# Patient Record
Sex: MALE | Race: WHITE | Employment: FULL TIME
[De-identification: names, ages, dates, MRNs, and addresses within clinical notes are randomized per-mention and may not be internally consistent; named-entity substitution may affect disease eponyms.]

---

## 2017-06-21 ENCOUNTER — HOSPITAL ENCOUNTER (OUTPATIENT)
Dept: PHYSICAL THERAPY | Facility: CLINIC | Age: 42
Setting detail: THERAPIES SERIES
Discharge: HOME | End: 2017-06-21
Payer: COMMERCIAL

## 2017-06-21 PROCEDURE — 97750 PHYSICAL PERFORMANCE TEST: CPT

## 2017-06-21 PROCEDURE — 97110 THERAPEUTIC EXERCISES: CPT

## 2017-06-21 PROCEDURE — 97161 PT EVAL LOW COMPLEX 20 MIN: CPT

## 2017-07-03 ENCOUNTER — HOSPITAL ENCOUNTER (OUTPATIENT)
Dept: PHYSICAL THERAPY | Facility: CLINIC | Age: 42
Setting detail: THERAPIES SERIES
Discharge: HOME | End: 2017-07-03
Payer: COMMERCIAL

## 2017-07-03 PROCEDURE — 97140 MANUAL THERAPY 1/> REGIONS: CPT

## 2017-07-03 PROCEDURE — 97016 VASOPNEUMATIC DEVICE THERAPY: CPT

## 2017-07-03 PROCEDURE — 97110 THERAPEUTIC EXERCISES: CPT

## 2017-07-10 ENCOUNTER — HOSPITAL ENCOUNTER (OUTPATIENT)
Dept: PHYSICAL THERAPY | Facility: CLINIC | Age: 42
Setting detail: THERAPIES SERIES
Discharge: HOME | End: 2017-07-10
Payer: COMMERCIAL

## 2019-02-26 ENCOUNTER — HOSPITAL ENCOUNTER (OUTPATIENT)
Dept: PHYSICAL THERAPY | Facility: CLINIC | Age: 44
Setting detail: THERAPIES SERIES
Discharge: HOME | End: 2019-02-26
Payer: COMMERCIAL

## 2019-02-26 PROCEDURE — 97110 THERAPEUTIC EXERCISES: CPT

## 2019-02-26 PROCEDURE — 97161 PT EVAL LOW COMPLEX 20 MIN: CPT

## 2019-12-13 ENCOUNTER — HOSPITAL ENCOUNTER (OUTPATIENT)
Dept: PHYSICAL THERAPY | Facility: CLINIC | Age: 44
Setting detail: THERAPIES SERIES
Discharge: HOME | End: 2019-12-13
Payer: COMMERCIAL

## 2019-12-13 PROCEDURE — 97140 MANUAL THERAPY 1/> REGIONS: CPT

## 2019-12-13 PROCEDURE — 97161 PT EVAL LOW COMPLEX 20 MIN: CPT

## 2020-09-09 ENCOUNTER — HOSPITAL ENCOUNTER (OUTPATIENT)
Dept: PHYSICAL THERAPY | Facility: CLINIC | Age: 45
Setting detail: THERAPIES SERIES
Discharge: HOME | End: 2020-09-09
Payer: COMMERCIAL

## 2020-09-09 PROCEDURE — 97140 MANUAL THERAPY 1/> REGIONS: CPT

## 2020-09-09 PROCEDURE — 97110 THERAPEUTIC EXERCISES: CPT

## 2020-09-09 PROCEDURE — 97161 PT EVAL LOW COMPLEX 20 MIN: CPT

## 2020-09-09 NOTE — CONSULTS
[x] [facility]    [address] [facility],     Phone: [phone]    Fax:  [phone]     Physical Therapy Running Evaluation    Date:  2020  Patient: Canelo Wallace  : 1975  MRN: [id number]  Physician: Dr. Elida Song: [address] (40visits, $35, no coinsur, ded waived, OOP met)  Medical Diagnosis: Chronic LBP Rehab Codes: M54.5  Onset date: 9/3/20   Next 's appt.: none    Subjective:   CC: Ricardo LBP   HPI: pain feels like it is inside. Pain has increased with return from a forward bent position and with sitting. PMHx: [x] Unremarkable [] Diabetes [] HTN  [] Pacemaker   [] MI/Heart Problems [] Cancer [] Arthritis  [] Other:              [] Refer to full medical chart  In EPIC     Tests: [] X-Ray: [] MRI:  [] none:     Medications: [] Refer to full medical record [] None [] Other:  Allergies:      [] Refer to full medical record [] None [] Other:    Working:  [x] Normal Duty  [] Light Duty  [] Off D/T Condition  [] Retired    [] Not Employed    []  Disability  [] Other:           Return to work:     Job/ADL Description: [address] [place] [address] Street: a great deal of travel  Next goal race:  [employer]  trail    Pain:  [x] Yes  [] No   Location:   Pain Rating: (0-10 scale) Worst Now  1. LB      2-7/10   7/10 for a short duration      Pain altered Tx:  [] Yes  [x] No  Action:  Symptoms:  [] Improving [] Worsening [x] Same  Better:  [] Meds    [] Ice pack    [] Sit    [] Not running  []Stand    [] Walk    [] Stretching   [x] Other: foam rolling 7 days/wk  Worse: [] Run    [] Easy    [] Speed work    []Stand    [] Walk    [] Stairs    [x] Sit    [x] Other: assuming a more erect position,   Sleep: [] OK    [] Disturbed    Objective:    ROM  ° A/P STRENGTH TESTS (+/-) Left Right Not Tested    Left Right Left Right Ant.  Drawer   []   Hip Flex 100 100   Post. Drawer   []   Ext 10 10   Lachmans   []   ER stiff stiff   Valgus Stress   []   IR stiff stiff   Varus Stress   []   ABD 20 20   Sharifas   []   ADD     Pat-Fem Grind   []   Knee Flex     FADIRs   []   Ext     Hip Scouring   []   Ankle DF     ANDREWs - - []   PF     Piriformis   []   INV     Haydees   []   EVER     Derek     []   GTE     Rodrigo's   []       OBSERVATION No Deficit Deficit Not Tested Comments   Posture       Forward Head [] [] []    Rounded Shoulders [] [] []    Kyphosis [] [] []    Lordosis [] [x] [] increased   Scoliosis [] [] []    Iliac Crest [] [] []    PSIS [] [] []    ASIS [] [] []    Genu Valgus [] [] []    Genu Varus [] [] []    Genu Recurvatum [] [] []    Pronation [] [] []    Supination [] [] []    Leg Length Discrp [x] [] []    Slumped Sitting [] [] []    Palpation [] [x] [] Sindy prox and distal hip flexor, piriformis   Sensation [] [] []    Edema [] [] []    Neurological [] [] []    Patellar Mobility [] [] []    Patellar Orientation [] [] []    Gait [] [] [] Analysis:Deferred         Comments:  Assessment: pt presents with increased sindy LBP which is due to Sindy hip flexor and piriformis spasm, that limit his hip mobility  Problems:    [x] ? Pain:     [x] ? ROM:    [x] ? Strength:    [x] ? Function: Not able to run as s/he wishes       STG: (to be met in 5 treatments)  1. ? Pain:<3/10 at the worst on an occasional basis  2. ? ROM: hip ext to 20 deg   3. ? Strength: at least 4/5 with all hip movments sindy  4. ? Function:   5. Independent with Home Exercise Programs    LTG: (to be met in 10 treatments)  1. No pain in the low back  2. MMT is 5/5 with all hip movements  3. Able to run  [employer] 1/2 marathon  4.                   Patient goals: Path to eliminate back pain    Rehab Potential:  [x] Good  [] Fair  [] Poor   Suggested Professional Referral:  [x] No  [] Yes:  Barriers to Goal Achievement[de-identified]  [x] No  [] Yes:  Domestic Concerns:  [x] No  [] Yes:    Pt. Education:  [x] Plans/Goals, Risks/Benefits discussed  [] Home exercise program    Method of Education: Moderate       []  High  1   [] Phys perf test     []  Ther Exercise     [x]  Manual Therapy 30 2   []  Ther Activities     []  Aquatics     []  Vasocompression     []  NMR       TOTAL TREATMENT TIME:     Time in:1210   Time Out:1310    Electronically signed by: Jun Esteban PT         Physician Signature:________________________________Date:__________________  By signing above or cosigning this note, I have reviewed this plan of care and certify a need for medically necessary rehabilitation services.      *PLEASE SIGN ABOVE AND FAX BACK ALL PAGES*

## 2020-09-14 ENCOUNTER — HOSPITAL ENCOUNTER (OUTPATIENT)
Dept: PHYSICAL THERAPY | Facility: CLINIC | Age: 45
Setting detail: THERAPIES SERIES
Discharge: HOME | End: 2020-09-14
Payer: COMMERCIAL

## 2020-09-14 PROCEDURE — 97140 MANUAL THERAPY 1/> REGIONS: CPT

## 2020-09-14 NOTE — FLOWSHEET NOTE
[x] [facility]    [address] [place] [address]    Phone: [phone]    Fax:  [phone]       Physical Therapy Daily Treatment Note    Date:  2020  Patient Name:  Toño Gonzalez   :  1975  MRN: [id number]  Physician: Dr. Yonathan Vargas: [facility] (40visits, $35, no coinsur, ded waived, OOP met)  Medical Diagnosis: Chronic LBP    Rehab Codes: M54.5  Onset date: 9/3/20                            Next Dr's appt.: none  Visit# / total visits: 2/   Cancels/No Shows: 0/0  Frequency:  1-3x/week for 10 visits    Subjective:    Pain:  [] Yes  [] No Location: LB Pain Rating: (0-10 scale) --/10  Pain altered Tx:  [x] No  [] Yes  Action:  Comments: feeling better. Ran with minimal issues. Objective:  Modalities: none  Precautions: standard  Exercises:  Exercise Reps/ Time Weight/ Level Issued for HEP   Comments                                                       Other:  Manual  1.  DI to proximal and distal hip flexors sindy  2. IASTM to sindy buttock with Hypervolt    Specific Instructions for next treatment:    Treatment Charges: Mins Units   []  Phys perf test     []  Ther Exercise     [x]  Manual Therapy 45 3   []  Ther Activities     []  NMR     []  Vasocompression     []  Other     Total Treatment time 45 3       Assessment: [x] Progressing toward goals. Improved hip ext ROM post manual.  Proximal hip flexor tenderness is minimal and distal resolves quickly    [] No change. [] Other:    Goals:  STG: (to be met in 5 treatments)  1. ? Pain:<3/10 at the worst on an occasional basis  2. ? ROM: hip ext to 20 deg   3. ? Strength: at least 4/5 with all hip movments sindy  4. ? Function:   5. Independent with Home Exercise Programs     LTG: (to be met in 10 treatments)  1. No pain in the low back  2. MMT is 5/5 with all hip movements  3.  Able to run  [place] 1/2 marathon  4.                   Patient goals: Path to eliminate back pain       Pt. Education:  [x] Yes  [] No  [] Reviewed Prior HEP/Ed  Method of Education: [x] Verbal  [] Demo  [] Written  Comprehension of Education:  [x] Verbalizes understanding. [] Demonstrates understanding. [] Needs review. [] Demonstrates/verbalizes HEP/Ed previously given. Plan: [x] Continue per plan of care.  1x/wk   [] Other:      Time In:1205           Time Out: 1250    Electronically signed by:  Aneudy Strickland, PT

## 2020-09-16 ENCOUNTER — APPOINTMENT (OUTPATIENT)
Dept: PHYSICAL THERAPY | Facility: CLINIC | Age: 45
End: 2020-09-16
Payer: COMMERCIAL

## 2020-09-21 ENCOUNTER — HOSPITAL ENCOUNTER (OUTPATIENT)
Dept: PHYSICAL THERAPY | Facility: CLINIC | Age: 45
Setting detail: THERAPIES SERIES
Discharge: HOME | End: 2020-09-21
Payer: COMMERCIAL

## 2020-09-21 PROCEDURE — 97140 MANUAL THERAPY 1/> REGIONS: CPT

## 2020-12-11 ENCOUNTER — HOSPITAL ENCOUNTER (OUTPATIENT)
Dept: PHYSICAL THERAPY | Facility: CLINIC | Age: 45
Setting detail: THERAPIES SERIES
Discharge: HOME | End: 2020-12-11
Payer: COMMERCIAL

## 2020-12-11 PROCEDURE — 97140 MANUAL THERAPY 1/> REGIONS: CPT

## 2020-12-11 NOTE — FLOWSHEET NOTE
worst on an occasional basis  2. ? ROM: hip ext to 20 deg   3. ? Strength: at least 4/5 with all hip movments sindy  4. ? Function:   5. Independent with Home Exercise Programs     LTG: (to be met in 10 treatments)  1. No pain in the low back  2. MMT is 5/5 with all hip movements  3. Able to run  [employer] 1/2 marathon                   Patient goals: Path to eliminate back pain       Pt. Education:  [x] Yes  [] No  [x] Reviewed Prior HEP/Ed  Method of Education: [x] Verbal  [] Demo  [] Written  Comprehension of Education:  [x] Verbalizes understanding. [] Demonstrates understanding. [] Needs review. [] Demonstrates/verbalizes HEP/Ed previously given. Plan: [x] Continue per plan of care. Pt will follow up as needed.     [] Other:      Time In:1300  Time Out: 3870  Electronically signed by:  Kelechi Mark, PT

## 2021-02-19 ENCOUNTER — HOSPITAL ENCOUNTER (OUTPATIENT)
Dept: PHYSICAL THERAPY | Facility: CLINIC | Age: 46
Setting detail: THERAPIES SERIES
Discharge: HOME | End: 2021-02-19
Payer: COMMERCIAL

## 2021-02-19 PROCEDURE — 97016 VASOPNEUMATIC DEVICE THERAPY: CPT

## 2021-02-19 PROCEDURE — 97140 MANUAL THERAPY 1/> REGIONS: CPT

## 2021-02-19 PROCEDURE — 97110 THERAPEUTIC EXERCISES: CPT

## 2021-02-19 NOTE — FLOWSHEET NOTE
[x] [facility]    [address] [place], [place]    Phone: [phone]    Fax:  [phone]       Physical Therapy Daily Treatment Note    Date:  2021  Patient Name:  Minda Chaudhary   :  1975  MRN: [id number]  Physician: Dr. Kenn Miller: [facility] (40visits, $35, no coinsur, ded waived, OOP met)  Medical Diagnosis: Chronic LBP    Rehab Codes: M54.5  Onset date: 9/3/20                            Next Dr's appt.: none  Visit# / total visits: 5/10  Cancels/No Shows: 0/0    Subjective:    Pain:  [x] Yes  [] No Location: LB Pain Rating: (0-10 scale) 6/10  Pain altered Tx:  [x] No  [] Yes  Action:  Comments:ran on treadmill yesterday and had significant increase in R foot pain. Enough that he had to stop. Points to middle of arch    Objective:  Modalities:   Treatment Location  Left      Right                          Position   R lower leg and foot []          [x]  [x] Supine    [] Prone   [] Side lying  [] Sitting          Treatment Modality   3 Vasocompression    34° temp    Med pressure     15min   1,2 Other:  Man, ex       Precautions: standard  Exercises:  Exercise Reps/ Time Weight/ Level Issued for HEP   Comments   Bindu calf stretch 3x30\"  x x    Burrito stretch 3x30\"  x x                                     Other:  Manual  1. IASTM to R calf  with Hypervolt and STM to calf and plantar foot     Specific Instructions for next treatment:    Treatment Charges: Mins Units   []  Phys perf test     [x]  Ther Exercise 8 1   [x]  Manual Therapy 35 2   []  Ther Activities     []  NMR     [x]  Vasocompression 15 1   []  Other     Total Treatment time 58 4       Assessment: [x] Progressing toward goals. Trigger points in R FHL, FDL and quadratus plantae. [] No change.      [] Other:    Goals:  STG: (to be met in 5 treatments)  1. ? Pain:<3/10 at the worst on an occasional basis  2. ? ROM: hip ext to 20 deg   3. ? Strength: at least 4/5 with all hip movments sindy  4. ? Function:   5. Independent with Home Exercise Programs     LTG: (to be met in 10 treatments)  1. No pain in the low back  2. MMT is 5/5 with all hip movements  3. Able to run  [employer] 1/2 marathon                   Patient goals: Path to eliminate back pain       Pt. Education:  [x] Yes  [] No  [x] Reviewed Prior HEP/Ed  Method of Education: [x] Verbal  [] Demo  [] Written  Comprehension of Education:  [x] Verbalizes understanding. [] Demonstrates understanding. [] Needs review. [] Demonstrates/verbalizes HEP/Ed previously given. Plan: [x] Continue per plan of care. Pt will follow up as needed.     [] Other:      Time KL:1409  Time Out: 3279  Electronically signed by:  Marsha Watts PT

## 2021-03-11 ENCOUNTER — HOSPITAL ENCOUNTER (OUTPATIENT)
Dept: PHYSICAL THERAPY | Facility: CLINIC | Age: 46
Setting detail: THERAPIES SERIES
Discharge: HOME | End: 2021-03-11
Payer: COMMERCIAL

## 2021-03-11 PROCEDURE — 97140 MANUAL THERAPY 1/> REGIONS: CPT

## 2021-03-11 PROCEDURE — 97110 THERAPEUTIC EXERCISES: CPT

## 2021-03-11 NOTE — FLOWSHEET NOTE
[x] [facility]    [address] [place] [address]    Phone: [phone]    Fax:  [phone]       Physical Therapy Daily Treatment Note    Date:  3/11/2021  Patient Name:  Rebekah Hampton \"Husam\"  :  1975  MRN: [id number]  Physician: Dr. Rogel Bar: [facility] (40visits, $35, no coinsur, ded waived, OOP met)  Medical Diagnosis: Chronic LBP    Rehab Codes: M54.5  Onset date: 9/3/20                            Next Dr's appt.: none  Visit# / total visits: 5/10  Cancels/No Shows: 0/0    Subjective:    Pain:  [x] Yes  [] No Location: hip flexors, groin, hamstring Pain Rating: (0-10 scale) varied/10  Pain altered Tx:  [x] No  [] Yes  Action:  Comments:  New onset left groin/knee pain this week. Difficulty getting comfortable enough to sleep due to right insertional hamstring pain. Objective:  Modalities: none  Precautions: standard  Exercises:  Exercise Reps/ Time Weight/ Level Issued for HEP   Comments   Press ups     W/ sag for overpressure             Piriformis S 3x30\"  x   Supine. bilateral   Half Kneel Hip flexor S 3x30\"  x   bilateral           MET  xx    Left hip flex, right hip ext                       Other:  Manual  1.  DI to proximal and distal hip flexors, piriformis, glute med   2. IASTM to sindy buttock (glut med) with Hypervolt  3. Prone anterior glide to left hip with knee on foam roller      Specific Instructions for next treatment:add hamstring strengthening    Treatment Charges: Mins Units   []  Phys perf test     [x]  Ther Exercise 8 1   [x]  Manual Therapy 45 3   []  Ther Activities     []  NMR     []  Vasocompression     []  Other     Total Treatment time 53 4       Assessment: [x] Progressing toward goals. Left anterior annonimate rotation, improved after MET by PT. 5 degrees or less of hip extension. Left piriformis tightness, improved post manual. Issued stretches for post run.  Felt better post treatment, has long run this weekend, returns to PT 1x next week. [] No change. [] Other:    Goals:  STG: (to be met in 5 treatments)  1. ? Pain:<3/10 at the worst on an occasional basis  2. ? ROM: hip ext to 20 deg   3. ? Strength: at least 4/5 with all hip movments sindy  4. ? Function:   5. Independent with Home Exercise Programs     LTG: (to be met in 10 treatments)  1. No pain in the low back  2. MMT is 5/5 with all hip movements  3. Able to run  [employer] 1/2 marathon                   Patient goals: Path to eliminate back pain       Pt. Education:  [x] Yes  [] No  [x] Reviewed Prior HEP/Ed  Method of Education: [x] Verbal  [] Demo  [x] Written  Comprehension of Education:  [x] Verbalizes understanding. [x] Demonstrates understanding. [] Needs review. [] Demonstrates/verbalizes HEP/Ed previously given. Plan: [x] Continue per plan of care.  Continue to monitor symptoms, assess pelvis, soft tissue work, hip ROM   [] Other:      Time In: 10:35 AM Time Out: 11:35 AM  Electronically signed by:  Isha Rogers PTA

## 2021-03-18 ENCOUNTER — HOSPITAL ENCOUNTER (OUTPATIENT)
Dept: PHYSICAL THERAPY | Facility: CLINIC | Age: 46
Setting detail: THERAPIES SERIES
Discharge: HOME | End: 2021-03-18
Payer: COMMERCIAL

## 2021-03-18 PROCEDURE — 97110 THERAPEUTIC EXERCISES: CPT

## 2021-03-18 PROCEDURE — 97140 MANUAL THERAPY 1/> REGIONS: CPT

## 2021-03-18 NOTE — FLOWSHEET NOTE
[x] [facility]    [address] [place] [address]    Phone: [phone]    Fax:  [phone]       Physical Therapy Daily Treatment Note    Date:  3/18/2021  Patient Name:  Liya Yuan \"Husam\"  :  1975  MRN: [id number]  Physician: Dr. Somers Passe: [facility] (40visits, $35, no coinsur, ded waived, OOP met)  Medical Diagnosis: Chronic LBP    Rehab Codes: M54.5  Onset date: 9/3/20                            Next 's appt.: none  Visit# / total visits: 7/10  Cancels/No Shows: 0/0    Subjective:    Pain:  [] Yes  [x] No Location: hip flexors, groin, hamstring Pain Rating: (0-10 scale) varied/10  Pain altered Tx:  [x] No  [] Yes  Action:  Comments:  Attempted to run the evening after last PT session, ran 1 mile then had burning pain in the bottom of right foot that went up to hip and back. Has not ran since, completing stretches, foam rolling, some cross training. Noticed lateral heel of right shoe was worn down and is typically worn more mid line bilaterally. Overall feeling better than last week. Objective:  Modalities: none  Precautions: standard  Exercises:  Exercise Reps/ Time Weight/ Level Issued for HEP  Comepleted Comments   Press ups     W/ sag for overpressure             Piriformis S 3x30\"  x  rev Supine. bilateral   Half Kneel Hip flexor S 3x30\"  x  rev bilateral           MET  xx    Left hip flex, right hip ext           Toe Yoga 20x sit x x bilat   Foot Doming 5\"x10 sit x x bilat   Resisted toe 2-5  flexion  x15  x x Emphasis on keeping big toe down                       Other:  Manual  1.  DI to proximal and distal hip flexors, piriformis, glute med   2.   IASTM to sindy buttock (glut med) with Hypervolt        Specific Instructions for next treatment:add hamstring strengthening    Treatment Charges: Mins Units   []  Phys perf test     [x]  Ther Exercise 15 1   [x]  Manual Therapy 40 3   []  Ther Activities     []  NMR     []  Vasocompression     []  Other     Total Treatment time 55 4       Assessment: [x] Progressing toward goals. Held MET, no pelvic rotation. Right piriformis most tender during manual, some right hip flexor tightness. Left greatly improved compared to last week. Began foot intrinsic exercise as patient demos use of ankle and gastroc in running and single leg stance. Updated HEP. Patient will call to schedmaximele PRN. [] No change. [] Other:    Goals:  STG: (to be met in 5 treatments)  1. ? Pain:<3/10 at the worst on an occasional basis  2. ? ROM: hip ext to 20 deg   3. ? Strength: at least 4/5 with all hip movments sindy  4. ? Function:   5. Independent with Home Exercise Programs     LTG: (to be met in 10 treatments)  1. No pain in the low back  2. MMT is 5/5 with all hip movements  3. Able to run  [employer] 1/2 marathon                   Patient goals: Path to eliminate back pain       Pt. Education:  [x] Yes  [] No  [x] Reviewed Prior HEP/Ed  Method of Education: [x] Verbal  [] Demo  [x] Written  Comprehension of Education:  [x] Verbalizes understanding. [x] Demonstrates understanding. [] Needs review. [x] Demonstrates/verbalizes HEP/Ed previously given. Plan: [x] Continue per plan of care.  Continue to monitor symptoms, assess pelvis, soft tissue work, hip ROM , foot intrisics   [x] Other: Patient not further scheduled at this time, does not wish to discharge yet, will call PRN      Time In: 2:00 PM Time Out: 3:05 PM  Electronically signed by:  Antoinette Montanez PTA

## 2022-02-16 ENCOUNTER — HOSPITAL ENCOUNTER (OUTPATIENT)
Dept: PHYSICAL THERAPY | Facility: CLINIC | Age: 47
Setting detail: THERAPIES SERIES
Discharge: HOME | End: 2022-02-16
Payer: COMMERCIAL

## 2022-02-16 PROCEDURE — 97110 THERAPEUTIC EXERCISES: CPT

## 2022-02-16 PROCEDURE — 97161 PT EVAL LOW COMPLEX 20 MIN: CPT

## 2022-02-16 NOTE — CONSULTS
Troponin is now 0.17, but no chest pain, no tele issues, no acute 12-lead EKG changes  -likely from rhabdo  -down-trended, no chest pain, stop trending  -monitor closely   [] [address] [facility]   Outpatient Rehabilitation &  Therapy  [address] [place] [address]  P: [phone]  F: [phone] [] [address]  P: [phone]  F: [phone] [] [address]  [facility]   [place]: [phone]  F: [phone]         Physical Therapy Running Evaluation    Date:  2022  Patient: Pravin Cavanaugh   : 1975  MRN: [id number]  Physician: Dr. Jose Forte:  yr; visit limit 40/40 remain ; $40 Copay ; [id number]; No Ded/Coinsurance  Medical Diagnosis: chronic R LBP  Rehab Codes: M54.5  Onset date: 22    Next 's appt.: none    Subjective:   CC: R buttock pain, sindy arch pain,   HPI: He has traveling more for work and has been having more LBP and pain in both arches. Ran 1/2 marathon 3 days ago and couldn't sleep due to R buttock pain. Movement helps. Prone, supine,and SL are all painful. 1 x aleve makes the pain go away. Worked with a : piriformis and ITB stretches, downward dog, cat/camel, child's pose. None of stretches aggravate his symptoms. L LTR helps a lot. Percussion gun helps too. Running does not typically aggravate.         PMHx: [] Unremarkable [] Diabetes [] HTN  [] Pacemaker   [] MI/Heart Problems [] Cancer [] Arthritis  [x] Other: stomach problems,               [] Refer to full medical chart  In EPIC     Tests: [] X-Ray: [] MRI:  [x] none:     Medications: [] Refer to full medical record [x] None [] Other:  Allergies:      [] Refer to full medical record [x] None [] Other:    Working:  [x] Normal Duty  [] Light Duty  [] Off D/T Condition  [] Retired    [] Not Employed    []  Disability  [] Other:           Return to work:     Next goal race: [facility]  [place]       Pain:  [x] Yes  [] No   Location:   Pain Rating: (0-10 scale)   1. R buttock  2/10 now; 5/10 worst and he will limp. Pain altered Tx:  [] Yes  [x] No  Action:  Symptoms:  [] Improving [] Worsening [] Same  Better:  [] Meds    [] Ice pack    [] Sit    [] Not running  []Stand    [] Walk    [] Stretching   [] Other:  Worse: [] Run    [] Easy    [] Speed work    []Stand    [] Walk    [] Stairs    [] Sit    [] Other:  Sleep: [] OK    [x] Disturbed    Objective:    ROM  ° A/P STRENGTH TESTS (+/-) Left Right Not Tested    Left Right Left Right Ant. Drawer   []   Hip Flex stiff stiff   Post. Drawer   []   Ext 10 5   Lachmans   []   ER  + pn   Valgus Stress   []   IR  wnl   Varus Stress   []   ABD     Sharifas   []   ADD     Pat-Fem Grind   []   Knee Flex     FADIRs   []   Ext     Hip Scouring   []   Ankle DF    5 ANDREWs  - []   PF    5 Piriformis - - []   INV    5 SLR ~40 ~40 []   EVER    5 Derek   + + []   GTE    5 Rodrigo's   []       OBSERVATION No Deficit Deficit Not Tested Comments   Posture       Forward Head [] [] []    Rounded Shoulders [] [] []    Kyphosis [] [] []    Lordosis [] [] []    Scoliosis [] [] []    Iliac Crest [] [] []    PSIS [] [] []    ASIS [] [] []    Genu Valgus [] [] []    Genu Varus [] [] []    Genu Recurvatum [] [] []    Pronation [] [] []    Supination [] [] []    Leg Length Discrp [x] [] []    Slumped Sitting [] [] []    Palpation [] [x] [] Psoas, gluts QL   Sensation [x] [] []    Edema [x] [] []    Neurological [x] [] []    Patellar Mobility [] [] []    Patellar Orientation [] [] []    Gait [] [] [] Analysis:deferred         Functional Test: Modified Oswestry Score: 16% functionally impaired     Comments:  Assessment:Patient would benefit from skilled physical therapy services in order to: decrease R buttock pain so that he can return to running without pain. Problems:    [x] ? Pain: in R LB/glute    [x] ? ROM: with hip ext    [x] ? Strength:    [x] ? Function: Not able to run as he wishes   [] ?  Balance  [] Increased edema:  [x] Postural Deviations as he has increased amount of time sitting  [] Gait Deviations  [x] Modified Oswestry score is 16%  [] Other:      STG: (to be met in 5 treatments)  1. ? Pain: <3/10 in LB/R glute  2. ? ROM: >20 deg of hip ext  3. ? Strength: 5/5 with hip ext and abd.   4. ? Function: >15 miles/wk  5. Modified Oswestry score to <10% interference  6. Independent with Home Exercise Programs    LTG: (to be met in 10 treatments)  1. No pain in R LB/glute    2. Modified Oswestry<5% interference  3. Able to run without pain                 Patient goals:\"relieve pain\"    Rehab Potential:  [x] Good  [] Fair  [] Poor   Suggested Professional Referral:  [x] No  [] Yes:  Barriers to Goal Achievement[de-identified]  [x] No  [] Yes:  Domestic Concerns:  [x] No  [] Yes:    Pt. Education:  [x] Plans/Goals, Risks/Benefits discussed  [x] Home exercise program    Method of Education: [] Verbal  [x] Demo  [] Written  Comprehension of Education:  [] Verbalizes understanding. [] Demonstrates understanding. [] Needs Review. [] Demonstrates/verbalizes understanding of HEP/Ed previously given.     Treatment Plan:  [x] Therapeutic Exercise    [x] Therapeutic Activity  [x] Manual Therapy   [x] Alter G treadmill  [x] Phys perf test     [x] Vasocompression/Game Ready   [x] Neuromuscular Re-education [x] Instruction in HEP     [x] Aquatic Therapy                           Frequency:  1x/week for 10 visits    Todays Treatment:  Modalities: none  Precautions: standard  Exercises:  Exercise Reps/ Time Weight/ Level Issued for HEP  Comments   Prone        Prone on elbows 5'  x x    Press ups 3x10  x x W/ exhale   Supine        Derek stretch 3x30\"  x x    1 legged bridges   x     Gym        Standing back bends   x  1/2 legged   Monster walks        1/2 kneeling hip flexor stretch 3x30\"  x x    Step downs     Posterior and lateral   Posterior sling ex     On cable column   Ski jumper lunges        Functional reach        Reverse twisting lunge        RDLs     Retract scaps, one hand over, one hand under Resisted C skip      Neutral L spine   Front squats     No L ext   Resisted Push Press        Ninja jumps     Soft landings on box; jump up/step down   Depth drops        Depth jumps     Jump down w/ low aundrea   Med ball cleans     Start on chest, elbows wide   Other:  Manual  1.  DI to R hip flexor (proximal and distal) piriformis/glute, manual stretching of R hip flexor and hip external rotators. 2. IaSTM via Hypervolt to R glute and hamstring    Specific Instructions for next treatment:  1. Continue with manual    Treatment Charges: Mins Units   [x] Evaluation       [x]  Low       []  Moderate       []  High  1   [] Phys perf test     []  Ther Exercise     [x]  Manual Therapy 20 1   []  Ther Activities     []  Aquatics     []  Vasocompression     []  NMR       TOTAL TREATMENT TIME: 58    Time in:1400   Time Out:1500    Electronically signed by: Brandon Jernigan PT        Physician Signature:________________________________Date:__________________  By signing above or cosigning this note, I have reviewed this plan of care and certify a need for medically necessary rehabilitation services.      *PLEASE SIGN ABOVE AND FAX BACK ALL PAGES*

## 2022-04-07 ENCOUNTER — HOSPITAL ENCOUNTER (OUTPATIENT)
Dept: PHYSICAL THERAPY | Facility: CLINIC | Age: 47
Setting detail: THERAPIES SERIES
Discharge: HOME | End: 2022-04-07
Payer: COMMERCIAL

## 2022-04-07 PROCEDURE — 97110 THERAPEUTIC EXERCISES: CPT

## 2022-04-07 PROCEDURE — 97140 MANUAL THERAPY 1/> REGIONS: CPT

## 2022-04-07 PROCEDURE — 97161 PT EVAL LOW COMPLEX 20 MIN: CPT

## 2022-04-07 NOTE — PROGRESS NOTES
[] [facility]) [facility] &  Therapy  [address]  [phone]  F: [phone] [] [address]  [address]   [address]  P: [phone]  F: [phone] [] [address] &  Therapy  [address]  P: [phone]  F: [phone] [x] [address]  P: [phone]  F: [phone] [] [address]  [facility]   [address]   [place]: [phone]  F: [phone]      Physical Therapy Upper Extremity Evaluation    Date:  2022  Patient: Alysia Abdul   : 1975  MRN: [id number]  Physician: Dr. Ayoub Emely: Tony Felton yr; visit limit 40/40 remain ; $40 Copay ; [id number]-$9.36AKZ; No Ded/Coinsurance  Medical Diagnosis:  Neck/R shoulder Rehab Codes: M54.2  Onset Date:3/17/22     Next 's appt: none    Subjective:   CC: R neck/scapular/arm pain  HPI: woke up one morning with increased R shoulder and neck pain. Right now, the symptoms are down to his R elbow. No effect on running, but it did wake him up last night. Pain is = with rotation and he feels like he is limited. Flex/ext feels like it is limited 20% and then he feels a tug. He is working on a laptop later in the evenings and admittedly his posture is an issue. Belly and side sleeper w/ 1 pillow. R sided sleeping is worse than L.       PMHx: [] Unremarkable [] Diabetes [] HTN  [] Pacemaker   [] MI/Heart Problems [] Cancer [] Arthritis  [x] Other: ulcer              [x] Refer to full medical chart  In EPIC       Comorbidities:   [] Obesity [] Dialysis  [] N/A   [] Asthma/COPD [] Dementia [] Other:   [] Stroke [] Sleep apnea [] Other:   [] Vascular disease [] Rheumatic disease [] Other:     Tests: [] X-Ray: [] MRI:  [] Other:    Medications: [] Refer to full medical record [] None [] Other:  Allergies:      [] Refer to full medical record  [] None [] Other:    Function:  Hand Dominance  [x] Right  [] Left  Patient lives with: In what type of home []  One story   [] Two story   [] Split level   Number of stairs to enter    With handrail on the []  Right to enter   [] Left to enter   Bathroom has a []  Tub only  [] Tub/shower combo   [] Walk in shower    []  Grab bars   Washing machine is on []  Main level   [] Second level   [] Basement   Employer [employer]   Job Status [x]  Normal duty   [] Light duty   [] Off due to condition    []  Retired   [] Not employed   [] Disability  [] Other:  []  Return to work: Work activities/duties        Pain:  [x] Yes  [] No Location: R scapula/R arm Pain Rating: (0-10 scale) 3/10  Pain altered Tx:  [] Yes  [x] No  Action:    Symptoms:  [] Improving [x] Worsening [] Same  Better:  [] AM    [] PM    [] Sit    [] Rise/Sit    []Stand    [] Walk    [] Lying    [] Other:  Worse: [] AM    [] PM    [] Sit    [] Rise/Sit    []Stand    [] Walk    [] Lying    [] Bend                      [] Valsalva    [] Other:  Sleep: [] OK    [x] Disturbed    Objective:     ROM  °A/P END FEEL STRENGTH TESTS (+/-) Left Right Not Tested    Left Right  Left Right Drop Arm   []   Sit Shld Flex      Sulcus Sign   []   Sit Shld Abd      Apprehension   []   Sit Shld IR      Yergasons   []   Shoulder Flex  wnl   4 Speeds   []   Ext  wnl   4+ Neer   []   ABD  wnl   5 Frias    []   ER @ 0 45 90  wnl   5 Painful Arc   []   IR     5 Tinel   []   Elbow Flex. 5       Elbow Ext.     5       Pronation     5       Supination     5       Wrist Flex.      5       Wrist Ext.     5       Finger add     4+       Ulnar Deviation                        C spine AROM is lacking ~10 deg in all directions    OBSERVATION No Deficit Deficit Not Tested Comments   Forward Head [] [x] []    Rounded Shoulders [] [x] []    Kyphosis [] [x] []    Scapular Height/Position [] [x] [] + scapular downward rot, ant scap tilt, protraction   Winging [] [] []    Scapulohumeral Rhythm [] [] []    INSPECTION/PALPATION       SC/AC Joint [] [] []    Supraspinatus [] [] []    Biceps tendon/groove [] [] []    Posterior shld [] [] []    Subscapularis [] [] []    NEUROLOGICAL       Cervical ROM/Quadrant [] [x] []    Reflexes [] [] []    Compression/Distraction [] [] []    Sensation [] [] []      Functional Test: UEFI Score: 11% functionally impaired     Comments:    Assessment:  Patient would benefit from skilled physical therapy services in order to: Attempted prone T-Y but pt had increased T/N in whole R hand. Problems:    [x] ? Pain:  [x] ? ROM: of C spine in all directions by 20%  [x] ? Strength: weakness   [] ? Function:  [] Other:      STG: (to be met in 5 treatments)  1. ? Pain: <2/10  2. ? ROM: to normal limits on C sp ROM  3. ? Strength: 5/5 with R shoulder flexion  4. ? Function: <5% interference  5. Patient to be independent with home exercise program as demonstrated by performance with correct form without cues. LTG: (to be met in 10 treatments)  1. No issues with sleeping  2. No pain in R neck/scapula/arm                   Patient goals: \"relieve pain\"    Rehab Potential:  [x] Good  [] Fair  [] Poor   Suggested Professional Referral:  [x] No  [] Yes:  Barriers to Goal Achievement:  [x] No  [] Yes:  Domestic Concerns:  [x] No  [] Yes:    Pt. Education:  [x] Plans/Goals, Risks/Benefits discussed  [x] Home exercise program  Method of Education: [x] Verbal   Instructed pt to contact PCP re: how much NSAIDs is he allowed to take  Proper posture when on his laptop     [x] Demo  [x] Written  Access Code: [email or web address]  URL: [email or web address]. com/  Date: 04/07/2022  Prepared by: Mikaela Camejo    Exercises  Supine Thoracic Mobilization Foam Roll Vertical - 2 x daily - 7 x weekly - 1 sets - 1 reps - 5 min hold  Seated Passive Cervical Retraction - 6 x daily - 7 x weekly - 1 sets - 10 reps  Corner Pec Major Stretch - 3 x daily - 7 x weekly - 1 sets - 3 reps - 30 seconds hold    Comprehension of Education:  [] Verbalizes understanding. [] Demonstrates understanding. [] Needs Review. [] Demonstrates/verbalizes understanding of HEP/Ed previously given. Treatment Plan:  [x] Therapeutic Exercise   43595    [] Therapeutic Activity  37090   [] Gait Training   30400   [x] Neuromuscular Re-education  33671   [x] Manual Therapy  06288   [x] Instruction in HEP           Frequency:  1x/week for 8 visits  Todays Treatment:  Modalities:   Precautions:  Exercises:  Exercise Reps/ Time Weight/ Level Completed Comments   Prone T-Y              Sitting       Chin tucks 3x10  x           Supine       On 1/2 foam roller 5'  x                                                            Other:  Manual  1.  DI to R pec minor and major      Specific Instructions for next treatment:  1. Evaluation Complexity:  History (Personal factors, comorbidities) [x] 0 [] 1-2 [] 3+   Exam (limitations, restrictions) [x] 1-2 [] 3 [] 4+   Clinical presentation (progression) [x] Stable [] Evolving  [] Unstable   Decision Making [x] Low [] Moderate [] High    [x] Low Complexity [] Moderate Complexity [] High Complexity       Treatment Charges: Mins Units   [x] Evaluation       [x]  Low       []  Moderate       []  High  1   []  Modalities     [x]  Ther Exercise 10 1   [x]  Manual Therapy 13 1   []  Ther Activities     []  Aquatics     []  Vasocompression     []  Other       TOTAL TREATMENT TIME: 60  Time in: 1130    Time Out: 9114    Electronically signed by: Diana Bermudez PT        Physician Signature:________________________________Date:__________________  By signing above or cosigning this note, I have reviewed this plan of care and certify a need for medically necessary rehabilitation services.      *PLEASE SIGN ABOVE AND FAX BACK ALL PAGES*

## 2022-04-18 ENCOUNTER — HOSPITAL ENCOUNTER (OUTPATIENT)
Dept: PHYSICAL THERAPY | Facility: CLINIC | Age: 47
Setting detail: THERAPIES SERIES
Discharge: HOME | End: 2022-04-18
Payer: COMMERCIAL

## 2022-04-18 PROCEDURE — 97110 THERAPEUTIC EXERCISES: CPT

## 2022-04-18 PROCEDURE — 97140 MANUAL THERAPY 1/> REGIONS: CPT

## 2022-04-18 NOTE — FLOWSHEET NOTE
[] [facility]) [facility] &  Therapy  [address]  [phone]  F: [phone] [] [address]  [address]   [address]  P: [phone]  F: [phone] [] [address]  [address]  P: [phone]  F: [phone] [x] [address]  P: [phone]  F: [phone] [] [address]  [facility]   [address]   [place]: [phone]  F: [phone]      Physical Therapy Daily Treatment Note    Date:  2022  Patient Name:  Andreia Calixto    :  1975  MRN: [id number]  Physician: Dr. Ke Moon: Bernabe Olmos yr; visit limit 40/40 remain ; $40 Copay ; [id number]-$2.03YUR; No Ded/Coinsurance  Medical Diagnosis:   Neck/R shoulder         Rehab Codes: M54.2  Onset Date:3/17/22                                        Next 's appt: none  Visit# / total visits: 2/10     Cancels/No Shows: 0/1    Subjective:    Pain:  [x] Yes  [] No Location: Neck/R shoulder Pain Rating: (0-10 scale) 2/10  Pain altered Tx:  [] No  [] Yes  Action:  Comments: Pt reported that most of his pain is located in the R shoulder moving down to his elbow.      Objective:  Modalities:   Precautions:  Exercises:  Exercise Reps/ Time Weight/ Level Comments   Supine      1/2 foam rollar 5'                 Side Lying      ER 2x10     abduction 2x10     Horizontal abduction 2x10                 Seated      Chin tucks Passive 3x10     Neck ROM 10x ea  Flex, side bend, rotation   UT stretch            Standing      Shoulder ext 2x10     rows 2x10     HAB 2x10                 Other:    Manual   1. DI to R pec minor and major and R UT       Treatment Charges: Mins Units   []  Modalities     []  Ther Exercise 35 2   []  Manual Therapy 15 1   []  Ther Activities     []  Aquatics     []  Vasocompression     []  Other     Total Treatment time 50 3       Assessment: [x] Progressing toward goals. [] No change. [] Other:  [x] Patient would continue to benefit from skilled physical therapy services in order to: Attempted prone T-Y but pt had increased T/N in whole R hand. STG/LTG  STG: (to be met in 5 treatments)  1. ? Pain: <2/10  2. ? ROM: to normal limits on C sp ROM  3. ? Strength: 5/5 with R shoulder flexion  4. ? Function: <5% interference  5. Patient to be independent with home exercise program as demonstrated by performance with correct form without cues.     LTG: (to be met in 10 treatments)  1. No issues with sleeping  2. No pain in R neck/scapula/arm                    Patient goals: \"relieve pain\"    Pt. Education:  [x] Yes  [] No  [] Reviewed Prior HEP/Ed  Method of Education: [x] Verbal  [x] Demo  [] Written  Comprehension of Education:  [x] Verbalizes understanding. [x] Demonstrates understanding. [] Needs review. [] Demonstrates/verbalizes HEP/Ed previously given. Access Code: [email or web address]  URL: [email or web address]  Date: 04/18/2022  Prepared by: [facility]    Exercises  Supine Thoracic Mobilization Foam Roll Vertical - 2 x daily - 7 x weekly - 1 sets - 1 reps - 5 min hold  Seated Passive Cervical Retraction - 6 x daily - 7 x weekly - 1 sets - 10 reps  Corner Pec Major Stretch - 3 x daily - 7 x weekly - 1 sets - 3 reps - 30 seconds hold  Sidelying Shoulder External Rotation - 2-3 x daily - 7 x weekly - 2-3 sets - 10 reps  Sidelying Shoulder Horizontal Abduction - 2-3 x daily - 7 x weekly - 2-3 sets - 10 reps  Sidelying Shoulder Abduction - 2-3 x daily - 7 x weekly - 2-3 sets - 10 reps  Standing Shoulder Extension with Resistance - 2-3 x daily - 7 x weekly - 2-3 sets - 10 reps  Standing Shoulder Horizontal Abduction with Resistance - 2-3 x daily - 7 x weekly - 2-3 sets - 10 reps  Standing Shoulder Row with Anchored Resistance - 2-3 x daily - 7 x weekly - 2-3 sets - 10 reps          Plan: [x] Continue current frequency toward long and short term goals.     [x] Specific Instructions for subsequent treatments: increase shoulder strength and decrease muscle tightness      Time In: 1300            Time Out: 1350    Electronically signed by:  Aleida Hnery PTA

## 2022-04-26 ENCOUNTER — HOSPITAL ENCOUNTER (OUTPATIENT)
Dept: PHYSICAL THERAPY | Facility: CLINIC | Age: 47
Setting detail: THERAPIES SERIES
Discharge: HOME | End: 2022-04-26
Payer: COMMERCIAL

## 2022-04-26 PROCEDURE — 97140 MANUAL THERAPY 1/> REGIONS: CPT

## 2022-04-26 NOTE — FLOWSHEET NOTE
[] [facility]) [facility] &  Therapy  [address]  [phone]  F: [phone] [] [address]  [facility] [address]   [address]  P: [phone]  F: [phone] [] [address]  [address]  P: [phone]  F: [phone] [x] [address]  P: [phone]  F: [phone] [] [address]  [facility]   [address]   [place]: [phone]  F: [phone]      Physical Therapy Daily Treatment Note    Date:  2022  Patient Name:  Estefany Kaur    :  1975  MRN: [id number]  Physician: Dr. Angel Tubbs: Marquita Pool yr; visit limit 40/40 remain ; $40 Copay ; PEF$8361-$9.55HST; No Ded/Coinsurance  Medical Diagnosis:   Neck/R shoulder         Rehab Codes: M54.2  Onset Date:3/17/22                                        Next 's appt: none  Visit# / total visits: 2/10     Cancels/No Shows: 0/1    Subjective:    Pain:  [x] Yes  [] No Location: Neck/R shoulder Pain Rating: (0-10 scale) 2/10  Pain altered Tx:  [] No  [] Yes  Action:  Comments: he reports that his R shoulder neck is much improved. He wishes to address R calf and R glut      Objective:  Modalities:   Precautions:  Exercises:  Exercise Reps/ Time Weight/ Level Comments   Supine      1/2 foam rollar 5'                 Side Lying      ER 2x10     abduction 2x10     Horizontal abduction 2x10                 Seated      Chin tucks Passive 3x10     Neck ROM 10x ea  Flex, side bend, rotation   UT stretch            Standing      Shoulder ext 2x10     rows 2x10     HAB 2x10                 Other:    Manual   1. STM to R medial calf, post tib, FDL, FHL. 2. STM to R PF  3.   IASTM w/ hypervolt to R glut       Treatment Charges: Mins reps  Standing Shoulder Extension with Resistance - 2-3 x daily - 7 x weekly - 2-3 sets - 10 reps  Standing Shoulder Horizontal Abduction with Resistance - 2-3 x daily - 7 x weekly - 2-3 sets - 10 reps  Standing Shoulder Row with Anchored Resistance - 2-3 x daily - 7 x weekly - 2-3 sets - 10 reps          Plan: [x] Continue current frequency toward long and short term goals.     [x] pt wishes to continue with HEP and will contact us as needed      Time In: 1300            Time Out: 229 [facility]    Electronically signed by:  Keegan Kuhn PT

## 2022-05-12 ENCOUNTER — HOSPITAL ENCOUNTER (OUTPATIENT)
Dept: PHYSICAL THERAPY | Facility: CLINIC | Age: 47
Setting detail: THERAPIES SERIES
Discharge: HOME | End: 2022-05-12
Payer: COMMERCIAL

## 2022-05-12 PROCEDURE — 97140 MANUAL THERAPY 1/> REGIONS: CPT

## 2022-05-12 NOTE — FLOWSHEET NOTE
[x] [facility] and Therapy    [address] [place], [place]    Phone: [phone]    Fax:  [phone]       Physical Therapy Daily Treatment Note    Date:  2022  Patient Name:  Estefany Kaur    :  1975  MRN: [id number]  Physician: Dr. Angel Tubbs: - Marquita Pool yr; visit limit 40/40 remain ; $40 Copay ; MDB$1122-$3.78DDG; No Ded/Coinsurance  Medical Diagnosis: chronic R LBP               Rehab Codes: M54.5  Onset date: 22                                         Next 's appt.: none  Visit# / total visits: 3/10   Cancels/No Shows:     Subjective:    Pain:  [x] Yes  [] No Location:  Pain Rating: (0-10 scale) 2-3/10  Pain altered Tx:  [x] No  [] Yes  Action:  Comments: Neck/shoulder have improved. But R buttock is still an issue.   He ran 1/2 marathon     Objective:  Precautions: standard  Exercises:  Exercise Reps/ Time Weight/ Level Issued for HEP   Comments   Prone             Prone on elbows 5'   x      Press ups 3x10   x  W/ exhale   Supine            Derek stretch 3x30\"   x      1 legged bridges     x      Gym            Standing back bends     x  1/2 legged   Monster walks            1/2 kneeling hip flexor stretch 3x30\"   x      Step downs         Posterior and lateral   Posterior sling ex         On cable column   Ski jumper lunges             Functional reach             Reverse twisting lunge             RDLs         Retract scaps, one hand over, one hand under   Resisted C skip          Neutral L spine   Front squats         No L ext   Resisted Push Press             Ninja jumps         Soft landings on box; jump up/step down   Depth drops             Depth jumps         Jump down w/ low aundrea   Med ball cleans         Start on chest, elbows wide   Other:  Manual  1.  DI to R hip flexor (proximal and distal) piriformis/glute, manual stretching of R hip flexor and hip external rotators. 2. IaSTM via Hypervolt to R glute and sindy calves      Specific Instructions for next treatment:    Treatment Charges: Mins Units   []  Phys perf test     []  Ther Exercise     [x]  Manual Therapy 62 4   []  Ther Activities     []  NMR     []  Vasocompression     []  Other     Total Treatment time 62 4       Assessment: [x] Progressing toward goals. R hip ext ROM continues to be limited to ~10 deg. He continues to have significant hip flexor and piriformis stiffness even after manual.  Recommended a trial with the Pso Rite and continued hip flexor stretching. [] No change. [] Other:    Goals:  STG: (to be met in 5 treatments)  1. ? Pain: <3/10 in LB/R glute  2. ? ROM: >20 deg of hip ext  3. ? Strength: 5/5 with hip ext and abd.   4. ? Function: >15 miles/wk  5. Modified Oswestry score to <10% interference  6. Independent with Home Exercise Programs     LTG: (to be met in 10 treatments)  1. No pain in R LB/glute    2. Modified Oswestry<5% interference  3. Able to run without pain                 Patient goals:\"relieve pain\"      Pt. Education:  [x] Yes  [] No  [x] Reviewed Prior HEP/Ed  Method of Education: [x] Verbal  [] Demo  [] Written  Comprehension of Education:  [] Verbalizes understanding. [] Demonstrates understanding. [x] Needs review. [] Demonstrates/verbalizes HEP/Ed previously given. Plan: [x] Continue per plan of care.  1x/wk pt wishes to follow up on a PRN basis   [] Other:     Time In:  1200         Time Out: 800 [facility]    Electronically signed by:  Trish Valles, PT

## 2022-05-12 NOTE — FLOWSHEET NOTE
[] [facility]) [facility] &  Therapy  [address]  [phone]  F: [phone] [] [address]  [address]   [address]  P: [phone]  F: [phone] [] [address]  [address]  P: [phone]  F: [phone] [x] [address]  P: [phone]  F: [phone] [] [address]  [facility]   [address]   [place]: [phone]  F: [phone]      Physical Therapy Daily Treatment Note    Date:  2022  Patient Name:  Molly Davey    :  1975  MRN: [id number]  Physician: Dr. Moni Rae: Homero Jon yr; visit limit 40/40 remain ; $40 Copay ; KAYLIN$4990-$9.55ARD; No Ded/Coinsurance  Medical Diagnosis:   Neck/R shoulder        Rehab Codes: M54.2  Onset Date:3/17/22                                       Next 's appt: none  Visit# / total visits: 3/10   Cancels/No Shows: 0/1    Subjective:    Pain:  [x] Yes  [] No Location: Neck/R shoulder Pain Rating: (0-10 scale) 2-3/10  Pain altered Tx:  [] No  [] Yes  Action:  Comments: neck/shoulder are resolved. But the R buttock is still an issue. Still tighens up with travel for work    Objective:  Modalities:   Precautions:  Exercises:  Exercise Reps/ Time Weight/ Level Comments   Supine      1/2 foam rollar 5'                 Side Lying      ER 2x10     abduction 2x10     Horizontal abduction 2x10                 Seated      Chin tucks Passive 3x10     Neck ROM 10x ea  Flex, side bend, rotation   UT stretch            Standing      Shoulder ext 2x10     rows 2x10     HAB 2x10                 Other:    Manual   1. DIto R hip flexor (proximal and distal), piriformis    2.   IASTM to R buttock with hypervolt       Treatment Charges: Mins Units   []  Modalities     []  Ther Exercise     [x]  Manual Therapy 50 3   []  Ther Activities     []  Aquatics     []  Vasocompression     []  Other     Total Treatment time 50 3       Assessment: [x] Progressing toward goals. Pt reports that he felt much better after manual and had full ROM of R hip     [] No change. [] Other:  [x] Patient would continue to benefit from skilled physical therapy services in order to: Attempted prone T-Y but pt had increased T/N in whole R hand. STG/LTG  STG: (to be met in 5 treatments)  1. ? Pain: <2/10  2. ? ROM: to normal limits on C sp ROM  3. ? Strength: 5/5 with R shoulder flexion  4. ? Function: <5% interference  5. Patient to be independent with home exercise program as demonstrated by performance with correct form without cues.     LTG: (to be met in 10 treatments)  1. No issues with sleeping  2. No pain in R neck/scapula/arm                    Patient goals: \"relieve pain\"    Pt. Education:  [x] Yes  [] No  [] Reviewed Prior HEP/Ed  Method of Education: [x] Verbal  [x] Demo  [] Written  Comprehension of Education:  [x] Verbalizes understanding. [x] Demonstrates understanding. [] Needs review. [] Demonstrates/verbalizes HEP/Ed previously given. Access Code: [email or web address]  URL: [email or web address]  Date: 04/18/2022  Prepared by: [facility]    Exercises  Supine Thoracic Mobilization Foam Roll Vertical - 2 x daily - 7 x weekly - 1 sets - 1 reps - 5 min hold  Seated Passive Cervical Retraction - 6 x daily - 7 x weekly - 1 sets - 10 reps  Corner Pec Major Stretch - 3 x daily - 7 x weekly - 1 sets - 3 reps - 30 seconds hold  Sidelying Shoulder External Rotation - 2-3 x daily - 7 x weekly - 2-3 sets - 10 reps  Sidelying Shoulder Horizontal Abduction - 2-3 x daily - 7 x weekly - 2-3 sets - 10 reps  Sidelying Shoulder Abduction - 2-3 x daily - 7 x weekly - 2-3 sets - 10 reps  Standing Shoulder Extension with Resistance - 2-3 x daily - 7 x weekly - 2-3 sets - 10 reps  Standing Shoulder Horizontal Abduction with Resistance - 2-3 x daily - 7 x weekly - 2-3 sets - 10 reps  Standing Shoulder Row with Anchored Resistance - 2-3 x daily - 7 x weekly - 2-3 sets - 10 reps          Plan: [x] Continue current frequency toward long and short term goals.     [x] pt wishes to continue with HEP and will contact us as needed      Time In: 1200Time Out: 1300    Electronically signed by:  Mikaela Camejo PT

## 2022-10-13 ENCOUNTER — HOSPITAL ENCOUNTER (OUTPATIENT)
Dept: PHYSICAL THERAPY | Facility: CLINIC | Age: 47
Setting detail: THERAPIES SERIES
Discharge: HOME | End: 2022-10-13
Payer: COMMERCIAL

## 2022-10-13 PROCEDURE — 97140 MANUAL THERAPY 1/> REGIONS: CPT

## 2022-10-13 NOTE — FLOWSHEET NOTE
[x] [facility]ly [facility]    [address], [place]    Phone: [phone]    Fax:  [phone]       Physical Therapy Daily Treatment Note    Date:  10/13/2022  Patient Name:  Levis Aase    :  1975  MRN: [id number]  Physician: Dr. Hooker Glass: - Anthony Danielson yr; visit limit 40/40 remain ; $40 Copay ; [facility]$4065-$4.18XYZ; No Ded/Coinsurance  Medical Diagnosis: chronic R LBP               Rehab Codes: M54.5  Onset date: 22                                         Next 's appt.: none  Visit# / total visits: 4/10   Cancels/No Shows:     Subjective:    Pain:  [x] Yes  [] No Location:  Pain Rating: (0-10 scale) 2-3/10  Pain altered Tx:  [x] No  [] Yes  Action:  Comments: pt enters requesting STM to hip flexors which are very tight. He did a [employer] run in [place] a couple of wks ago. No pain, but he is not able to loosen them up on his own.       Objective:  Precautions: standard  Exercises:  Exercise Reps/ Time Weight/ Level Issued for HEP   Comments   Prone             Prone on elbows 5'   x      Press ups 3x10   x  W/ exhale   Supine            Derek stretch 3x30\"   x      1 legged bridges     x      Gym            Standing back bends     x  1/2 legged   Monster walks            1/2 kneeling hip flexor stretch 3x30\"   x      Step downs         Posterior and lateral   Posterior sling ex         On cable column   Ski jumper lunges             Functional reach             Reverse twisting lunge             RDLs         Retract scaps, one hand over, one hand under   Resisted C skip          Neutral L spine   Front squats         No L ext   Resisted Push Press             Ninja jumps         Soft landings on box; jump up/step down   Depth drops             Depth jumps         Jump down w/ low aundrea   Med ball cleans         Start on chest, elbows wide   Other:  Manual  1.  DI to Ricardo  hip flexor (proximal and distal) and piriformis/glute,   2. IASTM via Hypervolt to ricardo  glute       Specific Instructions for next treatment:    Treatment Charges: Mins Units   []  Phys perf test     []  Ther Exercise     [x]  Manual Therapy 45 3   []  Ther Activities     []  NMR     []  Vasocompression     []  Other     Total Treatment time         Assessment: [x] Progressing toward goals. Ricardo  hip ext ROM has improved to WNL. The last visit he was in PT he had ~10 deg. He had quite a bit of tenderness t/o L psoas, R iliacus, ricardo distal hip flexors and ricardo piriformis    [] No change. [] Other:    Goals:  STG: (to be met in 5 treatments)  ? Pain: <3/10 in LB/R glute  ? ROM: >20 deg of hip ext  ? Strength: 5/5 with hip ext and abd.   ? Function: >15 miles/wk  Modified Oswestry score to <10% interference  Independent with Home Exercise Programs     LTG: (to be met in 10 treatments)  No pain in R LB/glute    Modified Oswestry<5% interference  Able to run without pain                 Patient goals:\"relieve pain\"      Pt. Education:  [x] Yes  [] No  [x] Reviewed Prior HEP/Ed  Method of Education: [x] Verbal  [] Demo  [] Written  Comprehension of Education:  [] Verbalizes understanding. [] Demonstrates understanding. [x] Needs review. [] Demonstrates/verbalizes HEP/Ed previously given. Plan: [x] Continue per plan of care.  Pt is scheduled 1x next week   [] Other:     Time In:  1110         Time Out: 1203    Electronically signed by:  Barbara Rees PT

## 2022-10-21 ENCOUNTER — HOSPITAL ENCOUNTER (OUTPATIENT)
Dept: PHYSICAL THERAPY | Facility: CLINIC | Age: 47
Setting detail: THERAPIES SERIES
Discharge: HOME | End: 2022-10-21
Payer: COMMERCIAL

## 2022-10-21 PROCEDURE — 97140 MANUAL THERAPY 1/> REGIONS: CPT

## 2025-02-20 ENCOUNTER — HOSPITAL ENCOUNTER (OUTPATIENT)
Facility: CLINIC | Age: 50
Discharge: HOME | End: 2025-02-20

## 2025-02-20 PROCEDURE — 9990000117

## 2025-02-20 NOTE — CONSULTS
protein/fat, under on carbs (average 37% short)     Hydration: water mostly; pedialyte occationally; liquid IV 1x/day (on run days)    Current Exercise: ([employer] 1/2 marathon next weekend; [employer] half in April; 1/2 in [place] in May)   -Was following full plan instead of half  -2x/week HIIT session (30 min) + run 4 days/week (tempo, speed, long, easy)   -Mileage currently between 24-28 miles/week   -No lifting    If in field - lunch will be usually bagel w/ pbj and fruit   Breakfast usually eggs if can get (or 1 egg and cheese omelete) or oatmeal + protein + fruit     Viome -     Labs to check   Change macros - increase carbs, decrease fat, increase protein   Eat before workouts   Protein at snacks           Treatment Charges:   [] INITIAL ASSESSMENT   []  FOLLOW UP   [x]  BUNDLE #1   []  BUNDLE #2   []  MONTHLY   []  RACE DAY   []     []         Electronically signed by:  CHRIS FERREIRA RD

## 2025-02-28 ENCOUNTER — APPOINTMENT (OUTPATIENT)
Dept: CT IMAGING | Age: 50
End: 2025-02-28
Payer: COMMERCIAL

## 2025-02-28 ENCOUNTER — HOSPITAL ENCOUNTER (EMERGENCY)
Age: 50
Discharge: HOME | End: 2025-02-28
Attending: EMERGENCY MEDICINE
Payer: COMMERCIAL

## 2025-02-28 VITALS
BODY MASS INDEX: 25.73 KG/M2 | WEIGHT: 190 LBS | SYSTOLIC BLOOD PRESSURE: 136 MMHG | TEMPERATURE: 97.7 F | RESPIRATION RATE: 16 BRPM | HEART RATE: 61 BPM | HEIGHT: 72 IN | OXYGEN SATURATION: 97 % | DIASTOLIC BLOOD PRESSURE: 95 MMHG

## 2025-02-28 DIAGNOSIS — M62.830 LUMBAR PARASPINAL MUSCLE SPASM: Primary | ICD-10-CM

## 2025-02-28 LAB
ALBUMIN SERPL-MCNC: 4.3 G/DL (ref 3.5–5.2)
ALBUMIN/GLOB SERPL: 1.5 {RATIO} (ref 1–2.5)
ALP SERPL-CCNC: 67 U/L (ref 40–129)
ALT SERPL-CCNC: 20 U/L (ref 5–41)
ANION GAP SERPL CALCULATED.3IONS-SCNC: 8 MMOL/L (ref 9–17)
AST SERPL-CCNC: 18 U/L
BASOPHILS # BLD: 0 K/UL (ref 0–0.2)
BASOPHILS NFR BLD: 1 % (ref 0–2)
BILIRUB SERPL-MCNC: 0.4 MG/DL (ref 0.3–1.2)
BILIRUB UR QL STRIP: NEGATIVE
BUN SERPL-MCNC: 15 MG/DL (ref 6–20)
CALCIUM SERPL-MCNC: 9.4 MG/DL (ref 8.6–10.4)
CHLORIDE SERPL-SCNC: 102 MMOL/L (ref 98–107)
CLARITY UR: CLEAR
CO2 SERPL-SCNC: 30 MMOL/L (ref 20–31)
COLOR UR: YELLOW
COMMENT: NORMAL
CREAT SERPL-MCNC: 0.9 MG/DL (ref 0.7–1.2)
EOSINOPHIL # BLD: 0.1 K/UL (ref 0–0.4)
EOSINOPHILS RELATIVE PERCENT: 2 % (ref 1–4)
ERYTHROCYTE [DISTWIDTH] IN BLOOD BY AUTOMATED COUNT: 13.3 % (ref 12.5–15.4)
GFR, ESTIMATED: >90 ML/MIN/1.73M2
GLUCOSE SERPL-MCNC: 105 MG/DL (ref 70–99)
GLUCOSE UR STRIP-MCNC: NEGATIVE MG/DL
HCT VFR BLD AUTO: 43.8 % (ref 41–53)
HGB BLD-MCNC: 15.3 G/DL (ref 13.5–17.5)
HGB UR QL STRIP.AUTO: NEGATIVE
KETONES UR STRIP-MCNC: NEGATIVE MG/DL
LEUKOCYTE ESTERASE UR QL STRIP: NEGATIVE
LIPASE SERPL-CCNC: 40 U/L (ref 13–60)
LYMPHOCYTES NFR BLD: 1.8 K/UL (ref 1–4.8)
LYMPHOCYTES RELATIVE PERCENT: 30 % (ref 24–44)
MCH RBC QN AUTO: 31.8 PG (ref 26–34)
MCHC RBC AUTO-ENTMCNC: 34.9 G/DL (ref 31–37)
MCV RBC AUTO: 91 FL (ref 80–100)
MONOCYTES NFR BLD: 0.6 K/UL (ref 0.1–1.2)
MONOCYTES NFR BLD: 10 % (ref 2–11)
NEUTROPHILS NFR BLD: 57 % (ref 36–66)
NEUTS SEG NFR BLD: 3.4 K/UL (ref 1.8–7.7)
NITRITE UR QL STRIP: NEGATIVE
PH UR STRIP: 6.5 [PH] (ref 5–8)
PLATELET # BLD AUTO: 206 K/UL (ref 140–450)
PMV BLD AUTO: 7.9 FL (ref 6–12)
POTASSIUM SERPL-SCNC: 4.3 MMOL/L (ref 3.7–5.3)
PROT SERPL-MCNC: 7.1 G/DL (ref 6.4–8.3)
PROT UR STRIP-MCNC: NEGATIVE MG/DL
RBC # BLD AUTO: 4.82 M/UL (ref 4.5–5.9)
SODIUM SERPL-SCNC: 140 MMOL/L (ref 135–144)
SP GR UR STRIP: 1.01 (ref 1–1.03)
UROBILINOGEN UR STRIP-ACNC: NORMAL EU/DL (ref 0–1)
WBC OTHER # BLD: 5.9 K/UL (ref 3.5–11)

## 2025-02-28 PROCEDURE — 85025 COMPLETE CBC W/AUTO DIFF WBC: CPT

## 2025-02-28 PROCEDURE — 74176 CT ABD & PELVIS W/O CONTRAST: CPT

## 2025-02-28 PROCEDURE — 81003 URINALYSIS AUTO W/O SCOPE: CPT

## 2025-02-28 PROCEDURE — 80053 COMPREHEN METABOLIC PANEL: CPT

## 2025-02-28 PROCEDURE — 6360000002 HC RX W HCPCS: Performed by: EMERGENCY MEDICINE

## 2025-02-28 PROCEDURE — 83690 ASSAY OF LIPASE: CPT

## 2025-02-28 PROCEDURE — 96372 THER/PROPH/DIAG INJ SC/IM: CPT

## 2025-02-28 PROCEDURE — 99284 EMERGENCY DEPT VISIT MOD MDM: CPT

## 2025-02-28 PROCEDURE — 6370000000 HC RX 637 (ALT 250 FOR IP): Performed by: EMERGENCY MEDICINE

## 2025-02-28 RX ORDER — LIDOCAINE 4 G/G
1 PATCH TOPICAL DAILY
Qty: 30 PATCH | Refills: 0 | Status: SHIPPED | OUTPATIENT
Start: 2025-02-28 | End: 2025-03-30

## 2025-02-28 RX ORDER — KETOROLAC TROMETHAMINE 30 MG/ML
30 INJECTION, SOLUTION INTRAMUSCULAR; INTRAVENOUS ONCE
Status: COMPLETED | OUTPATIENT
Start: 2025-02-28 | End: 2025-02-28

## 2025-02-28 RX ORDER — LIDOCAINE 4 G/G
1 PATCH TOPICAL ONCE
Status: DISCONTINUED | OUTPATIENT
Start: 2025-02-28 | End: 2025-02-28 | Stop reason: HOSPADM

## 2025-02-28 RX ORDER — OMEPRAZOLE 40 MG/1
40 CAPSULE, DELAYED RELEASE ORAL DAILY
COMMUNITY

## 2025-02-28 RX ORDER — CYCLOBENZAPRINE HCL 10 MG
10 TABLET ORAL ONCE
Status: COMPLETED | OUTPATIENT
Start: 2025-02-28 | End: 2025-02-28

## 2025-02-28 RX ORDER — CYCLOBENZAPRINE HCL 10 MG
10 TABLET ORAL 3 TIMES DAILY PRN
Qty: 21 TABLET | Refills: 0 | Status: SHIPPED | OUTPATIENT
Start: 2025-02-28 | End: 2025-03-10

## 2025-02-28 RX ADMIN — KETOROLAC TROMETHAMINE 30 MG: 30 INJECTION, SOLUTION INTRAMUSCULAR at 01:39

## 2025-02-28 RX ADMIN — CYCLOBENZAPRINE 10 MG: 10 TABLET, FILM COATED ORAL at 02:55

## 2025-02-28 ASSESSMENT — PAIN SCALES - GENERAL
PAINLEVEL_OUTOF10: 3
PAINLEVEL_OUTOF10: 4
PAINLEVEL_OUTOF10: 3

## 2025-02-28 ASSESSMENT — PAIN - FUNCTIONAL ASSESSMENT
PAIN_FUNCTIONAL_ASSESSMENT: 0-10
PAIN_FUNCTIONAL_ASSESSMENT: 0-10

## 2025-02-28 NOTE — ED PROVIDER NOTES
[facility] Emergency Department  [address]  [facility] [address]  Phone: [phone]  Fax: [phone]      Patient Name:  Lobito Garcia  Medical Record Number:  [id number]  YOB: 1975  Date of Service:  2/28/2025  Primary Care Physician:  Derek Perkins MD      CHIEF COMPLAINT:       Chief Complaint   Patient presents with    Flank Pain     Onset two weeks- initially pain has been intermittent and dull- an hour ago pain became intermittent and sharp-pain dropped pt to his knees  -pain is also worse with standing- or position change    Diarrhea     Lose onset today    Nausea     Pt denies any vomiting       HISTORY OF PRESENT ILLNESS:   Lobito Garcia is a 49 y.o. male who presents with the complaint of left flank pain.  The patient reports that starting approximately 2 weeks ago he developed gradual onset, constant, progressive, dull, achy, nonradiating, left flank pain that has been sharp and stabbing since 11:30 PM tonight.  He complains of associated nausea and an episode of loose nonbloody stool.  He denies any vomiting.  He took Aleve yesterday with some improvement.  He has not taken any medications today.  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  He denies any specific injury or falls.  He does not have any personal history of kidney stones.  The patient denies any history of abdominal surgeries.  He does treat for GERD with Prilosec.  The patient denies fever, chills, headache, vision changes, neck pain, chest pain, shortness of breath, abdominal pain, testicular pain, urinary/bowel symptoms, focal weakness, numbness, tingling, recent injury or illness.    CURRENT MEDICATIONS:      Discharge Medication List as of 2/28/2025  2:31 AM        CONTINUE these medications which have NOT CHANGED    Details   omeprazole (PRILOSEC) 40 MG delayed release capsule Take 1 capsule by mouth dailyHistorical

## 2025-03-07 ENCOUNTER — HOSPITAL ENCOUNTER (OUTPATIENT)
Dept: PHYSICAL THERAPY | Facility: CLINIC | Age: 50
Setting detail: THERAPIES SERIES
Discharge: HOME | End: 2025-03-07
Payer: COMMERCIAL

## 2025-03-07 PROCEDURE — 97140 MANUAL THERAPY 1/> REGIONS: CPT

## 2025-03-07 PROCEDURE — 97161 PT EVAL LOW COMPLEX 20 MIN: CPT

## 2025-03-07 NOTE — CONSULTS
compression  31704    [] Gait Training   88432 [] Ultrasound   90476   [x] Neuromuscular Re-education  44950 [] Electrical Stimulation Unattended  15899   [x] Manual Therapy  64608 [] Electrical Stimulation Attended  16609   [x] Instruction in HEP  [] Lumbar/Cervical Traction  79219     Frequency:  1 x/week for 10 visits    Today’s Treatment:  Modalities: none  Precautions [x] No  [] Yes:  Exercises:  Exercise Reps/ Time Weight/ Level Comments                                 Other:  Manual  DI to sindy piriformis in prone with hip IR/ER  DI to L hip flexor proximal and distal  DI to L paraspinal  IASTM to L hamstring with hypervolt  Specific Instructions for next treatment:  1.continue with manual to improve hip mobility  2. Pt is also scheduled with Vivian CAMEJO PT to review foot mechanics and how they are affecting his hip and LB    Evaluation Complexity:  History (Personal factors, comorbidities) [x] 0 [] 1-2 [] 3+   Exam (limitations, restrictions) [x] 1-2 [] 3 [] 4+   Clinical presentation (progression) [x] Stable [] Evolving  [] Unstable   Decision Making [x] Low [] Moderate [] High    [x] Low Complexity [] Moderate Complexity [] High Complexity        Treatment Charges: Mins Units Time In/Out   [x] Evaluation       [x]  Low       []  Moderate       []  High      []  Modalities        []  Ther Exercise      []  Neuromuscular Re-ed      []  Gait Training      [x]  Manual Therapy 25 2    []  Ther Activities      []  Aquatics      []  Vasocompression      []  Cervical Traction      []  Other      Total Billable time 55 min         Time in: 1005      Time out: 1103    Electronically signed by: Abelino Wilkinson PT        Physician Signature:________________________________Date:__________________  By signing above or cosigning this note, I have reviewed this plan of care and certify a need for medically necessary rehabilitation services.     *PLEASE SIGN ABOVE AND FAX BACK ALL PAGES*

## 2025-03-14 ENCOUNTER — HOSPITAL ENCOUNTER (OUTPATIENT)
Facility: CLINIC | Age: 50
Discharge: HOME | End: 2025-03-14

## 2025-03-14 NOTE — FLOWSHEET NOTE
[] [facility]  Outpatient Rehabilitation &  Therapy  [address]  P: [phone]  F: [phone] [x] [facility]  Outpatient Rehabilitation &  Therapy  [address]  P: [phone]  F: [phone]       Date:  3/14/2025    Patient: Lobito Garcia  : 1975  MRN: [id number]    Previous Goals:   Adjust macros to: 350g carbs, 140g pro, 50g fat  Pre run snack  Performance snacks: close gaps between meals that are 4+ hours apart       Committed to doing a full on :  -15-16 tomorrow   -Ramping up to do [place] with [employer]   -40 miles/week (4-5 days/week)       BF: bagels, orange juice, wheat toast  Mid morning oatmeal   Lunch: probably missing most here; brioche bun sandwich + sunchips OR will get some rice or potatoes if they have it   -If out in field:   Dinner: 8 oz serving salmon or chicken or turkey sausage; or pasta; sides (mac n cheese or potatoes or rice and veggie)     Running afternoon usually   -[place] bars  -Toast and jam   -Skippy bar (wafer)   +Gatorade     Pedialyte, liquid IV, gatorade after run   Chocolate milk or protein shake after long run     **Alter macros  **Portion guide + a few examples   **Check carb load dpc for [place]

## 2025-03-17 ENCOUNTER — HOSPITAL ENCOUNTER (OUTPATIENT)
Dept: PHYSICAL THERAPY | Facility: CLINIC | Age: 50
Setting detail: THERAPIES SERIES
Discharge: HOME | End: 2025-03-17
Payer: COMMERCIAL

## 2025-03-17 PROCEDURE — 97110 THERAPEUTIC EXERCISES: CPT

## 2025-03-17 PROCEDURE — 97140 MANUAL THERAPY 1/> REGIONS: CPT

## 2025-03-17 NOTE — FLOWSHEET NOTE
[] [facility]  Outpatient Rehabilitation &  Therapy  [address]  P: [phone]  F: [phone] [] [facility]  Outpatient Rehabilitation &  Therapy  [address]  P: [phone]  F: [phone]     Physical Therapy Daily Treatment Note    Date:  3/17/2025  Patient Name:  Lobito Garcia   :  1975  MRN: [id number]  Physician: Dr. Derek Perkins                           Insurance: [employer]; Danny yr; 40/40vs; no auth req; hard max; not comb; $40 copay; ded waived; 4900/4407.74 remain OOP   Medical Diagnosis: L LBP                 Rehab Codes: M54.5  Onset Date: 25                 Next 's appt.: 2 months  Visit# / total visits: 2/10   Cancels/No Shows: 0/0    Subjective:    Pain:  [x] Yes  [] No Location: L LB  Pain Rating: (0-10 scale) /10  Pain altered Tx:  [x] No  [] Yes  Action:  Comments: working on mobility and pinpoints to L quadratus lumborum.  Foam rolling makes it sorer. Follow through on golf swing is when he feels his LB as well.     Objective:  Modalities: none  Precautions [x] No  [] Yes:  Exercises:  Exercise Reps/ Time Weight/ Level Completed Comments    Supine           static bridge w/ alt SKTC 10x 55 cm ball  Blue TB X      Diaphragmatic breathing 10x   x      Prone          Diaphragmatic breathing 10x  X    Quadruped        Dorinda breathing 10  x    Child's pose 5x15\"  X    Thread the needle 10x   X     Other:  Manual  DI to sindy piriformis in prone with hip IR/ER  DI to L hip flexor proximal and distal  DI to L paraspinal  IASTM to L hamstring with hypervolt    Specific Instructions for next treatment:  1.continue with manual to improve hip mobility  2. Pt is also scheduled with Vivian CAMEJO PT to review foot mechanics and how they are affecting his hip and LB        Assessment: [x] Progressing toward goals. L hip extension ROM improved significantly after manual.  Added TvA ex and ex for T spine mobility.   Attempted to mobilize T spine, but

## 2025-03-24 ENCOUNTER — HOSPITAL ENCOUNTER (OUTPATIENT)
Dept: PHYSICAL THERAPY | Facility: CLINIC | Age: 50
Setting detail: THERAPIES SERIES
Discharge: HOME | End: 2025-03-24
Payer: COMMERCIAL

## 2025-03-24 PROCEDURE — 97140 MANUAL THERAPY 1/> REGIONS: CPT

## 2025-03-24 PROCEDURE — 97110 THERAPEUTIC EXERCISES: CPT

## 2025-03-24 PROCEDURE — 97016 VASOPNEUMATIC DEVICE THERAPY: CPT

## 2025-03-24 NOTE — FLOWSHEET NOTE
[] [facility]  Outpatient Rehabilitation &  Therapy  [address]  P: [phone]  F: [phone] [] [facility]  Outpatient Rehabilitation &  Therapy  [address]  P: [phone]  F: [phone]     Physical Therapy Daily Treatment Note    Date:  3/24/2025  Patient Name:  Lobito Garcia   :  1975  MRN: [id number]  Physician: Dr. Derek Perkins                           Insurance: [employer]; Danny yr; 40/40vs; no auth req; hard max; not comb; $40 copay; ded waived; 9400/9401.74 remain OOP   Medical Diagnosis: L LBP                 Rehab Codes: M54.5  Onset Date: 25                 Next 's appt.: 2 months  Visit# / total visits: 2/10   Cancels/No Shows: 0/0    Subjective:    Pain:  [x] Yes  [] No Location: L LB  Pain Rating: (0-10 scale) /10  Pain altered Tx:  [x] No  [] Yes  Action:  Comments: working on mobility and pinpoints to L quadratus lumborum.  Foam rolling makes it sorer. Follow through on golf swing is when he feels his LB as well.     Objective:  Modalities: none  Precautions [x] No  [] Yes:  Exercises:  Exercise Reps/ Time Weight/ Level Completed Comments   Standing       Lax ball midfoot self mob x10  x    Toe yoga x20  x Mirror and cue to     Supine          Lat to pec stretch on foam roller x10  x    Foam roller thoracic self mob x10  x     static bridge w/ alt SKTC 10x2 55 cm ball  Blue TB X  cue for foot straight no IN    Diaphragmatic breathing 10x   x      Prone          Diaphragmatic breathing 10x      Quadruped        Dorinda breathing w/ primal pushup 10  x    Child's pose 5x15\"      Thread the needle 10x        Other:  Manual  DI to sindy piriformis in prone with hip IR/ER  DI to L hip flexor proximal and distal  DI to L paraspinal  4. MWM R posterior glide fibula    Specific Instructions for next treatment:  1.continue with manual to improve hip mobility  2. Pt is also scheduled with Vivian CAMEJO PT to review foot mechanics and how they are

## 2025-03-31 ENCOUNTER — HOSPITAL ENCOUNTER (OUTPATIENT)
Dept: PHYSICAL THERAPY | Facility: CLINIC | Age: 50
Setting detail: THERAPIES SERIES
Discharge: HOME | End: 2025-03-31
Payer: COMMERCIAL

## 2025-03-31 PROCEDURE — 97140 MANUAL THERAPY 1/> REGIONS: CPT

## 2025-03-31 PROCEDURE — 97110 THERAPEUTIC EXERCISES: CPT

## 2025-03-31 NOTE — FLOWSHEET NOTE
is nearly equal B. Need to continue to work on posture and core strength. Educated on pelvic positioning during exercises to train neutral spine. Pt tends to fall into anterior pelvic tilt.    [] No change.     [] Other:    Goals:  STG: (to be met in 5 treatments)  ? Pain: <2/10 at all times in his L LB.    ? ROM: SLR to 75 deg sindy and hip ext to 20 deg sindy  ? Strength:  ? Function: UWRI <20% impaired  Patient to be independent with home exercise program as demonstrated by performance with correct form without cues.     LTG: (to be met in 10 treatments)  No LBP  UWRI <10% impaired  Full hip mobility        Patient goals: no LBP  Treatment Charges: Mins Units Time In/Out   []  Modalities        [x]  Ther Exercise 45 3    []  Neuromuscular Re-ed      []  Gait Training      [x]  Manual Therapy 15 1    []  Ther Activities      []  Aquatics      []  Vasocompression      []  Cervical Traction      []  Other      Total Billable time  60 4        Pt. Education:  [x] Yes  [] No  [] Reviewed Prior HEP/Ed  Method of Education:   [x] Verbal      [x] Demo  [x] Written  Access Code: [email or web address]  URL: [email or web address]  Date: 03/24/2025  Prepared by: Vivian Thompson    Program Notes  Husam Garcia     Exercises  - primal pushup w/ diaphragmatic breathing  - 1 x daily - 7 x weekly - 3 sets - 10 reps  - Derek Stretch on Table  - 2 x daily - 7 x weekly - 3 reps - 30 seconds hold  - Single Leg Bridge  - 2 x daily - 7 x weekly - 2 sets - 10 reps  - Supine hip flexion w/ TB and feet on a ball  - 2 x daily - 7 x weekly - 2 sets - 10 reps  - Sidelying Hip Abduction at Wall  - 2 x daily - 7 x weekly - 3 sets - 10 reps  - Side Lunge Adductor Stretch  - 2 x daily - 7 x weekly - 1 sets - 3 reps - 30 sec hold  - Quadruped Full Range Thoracic Rotation with Reach  - 1 x daily - 7 x weekly - 2 sets - 10 reps  - Pinky together stretch overhead returning to start with bent elbows  - 1 x daily - 7 x weekly - 10 reps  - Upper back foam roll

## 2025-04-07 ENCOUNTER — HOSPITAL ENCOUNTER (OUTPATIENT)
Dept: PHYSICAL THERAPY | Facility: CLINIC | Age: 50
Setting detail: THERAPIES SERIES
Discharge: HOME | End: 2025-04-07
Payer: COMMERCIAL

## 2025-04-07 PROCEDURE — 97110 THERAPEUTIC EXERCISES: CPT

## 2025-04-07 PROCEDURE — 97140 MANUAL THERAPY 1/> REGIONS: CPT

## 2025-04-07 NOTE — FLOWSHEET NOTE
Level pelvis this date. Thoracic rotation equal. Lumbar rotation is limited without providing external stability but improves with external stability indicating more abdominal strength is needed.Hip extension very limited B but corrected with manual work. Still demonstrating difficulty controlling foot intrinsics. Able to make nice corrections with cues for hinging at hip instead of stabilizing with LB during single leg star squat and step downs. Still lacking stability in frontal and transverse plane.     [] No change.     [] Other:    Goals:  STG: (to be met in 5 treatments)  ? Pain: <2/10 at all times in his L LB.    ? ROM: SLR to 75 deg sindy and hip ext to 20 deg sindy  ? Strength:  ? Function: UWRI <20% impaired  Patient to be independent with home exercise program as demonstrated by performance with correct form without cues.     LTG: (to be met in 10 treatments)  No LBP  UWRI <10% impaired  Full hip mobility        Patient goals: no LBP  Treatment Charges: Mins Units Time In/Out   []  Modalities        [x]  Ther Exercise 45 3    []  Neuromuscular Re-ed      []  Gait Training      [x]  Manual Therapy 15 1    []  Ther Activities      []  Aquatics      []  Vasocompression      []  Cervical Traction      []  Other      Total Billable time  60 4        Pt. Education:  [x] Yes  [] No  [] Reviewed Prior HEP/Ed  Method of Education:   [x] Verbal      [x] Demo  [x] Written  Access Code: [email or web address]  URL: [email or web address]  Date: 04/07/2025  Prepared by: Vivian Thompson    Program Notes  Husam Garcia     Exercises  - primal pushup w/ diaphragmatic breathing  - 1 x daily - 7 x weekly - 3 sets - 10 reps  - Primal Push Up with Shoulder Taps  - 1 x daily - 7 x weekly - 3 sets - 10 reps  - Derek Stretch on Table  - 2 x daily - 7 x weekly - 3 reps - 30 seconds hold  - Single Leg Bridge  - 2 x daily - 7 x weekly - 2 sets - 10 reps  - Supine hip flexion w/ TB and feet on a ball  - 2 x daily - 7 x weekly - 2 sets - 10

## 2025-04-14 ENCOUNTER — HOSPITAL ENCOUNTER (OUTPATIENT)
Dept: PHYSICAL THERAPY | Facility: CLINIC | Age: 50
Setting detail: THERAPIES SERIES
Discharge: HOME | End: 2025-04-14
Payer: COMMERCIAL

## 2025-04-14 PROCEDURE — 97140 MANUAL THERAPY 1/> REGIONS: CPT

## 2025-04-14 PROCEDURE — 97110 THERAPEUTIC EXERCISES: CPT

## 2025-04-14 NOTE — FLOWSHEET NOTE
[x] [facility]  Outpatient Rehabilitation &  Therapy  [address]  P: [phone]  F: [phone] [] [facility]  Outpatient Rehabilitation &  Therapy  [address]  P: [phone]  F: [phone]     Physical Therapy Daily Treatment Note    Date:  2025  Patient Name:  Lobito Garcia   :  1975  MRN: [id number]  Physician: Dr. Derek Perkins                           Insurance: [employer]; Danny yr; 40/40vs; no auth req; hard max; not comb; $40 copay; ded waived; 3759/1988.74 remain OOP   Medical Diagnosis: L LBP                 Rehab Codes: M54.5  Onset Date: 25                 Next 's appt.: 2 months  Visit# / total visits: 6/10   Cancels/No Shows: 0/0    Subjective:    Pain:  [x] Yes  [] No Location: L LB  Pain Rating: (0-10 scale) /10  Pain altered Tx:  [x] No  [] Yes  Action:  Comments:did 10 on Saturday and 5 this AM. This is the best my legs have ever felt heading into a marathon  Objective:  Modalities: none  Precautions [x] No  [] Yes:  Exercises:  Exercise Reps/ Time Weight/ Level Completed Comments   Standing       Lax ball midfoot self mob x10      Toe yoga x20   Mirror and cue to     Supine          Lat to pec stretch on foam roller x10      Foam roller thoracic self mob x10       static bridge w/ alt SKTC 10x2 55 cm ball  Blue TB   cue for foot straight no IN    Diaphragmatic breathing 10x         Prone          Diaphragmatic breathing 10x             Supine deadbug with static hold weight overhead x20 20# x    Primal pushup shoulder touches 1 sets  x    Dorinda breathing w/ primal pushup and LE extension 1 sets  x    MOBO foot rocks x20  x    MOBO star squats 5x        MOBO twist x10 2/4 x    Step down X10 ea 8\" x Lateral and posterior   Slovak split squat x10  x    Monster walk 1 long lap grey x    Other:  Manual  DI to sindy piriformis in prone with hip IR/ER  DI hip flexor proximal  3. MWM hip extension    Specific Instructions for next

## 2025-04-16 ENCOUNTER — HOSPITAL ENCOUNTER (OUTPATIENT)
Facility: CLINIC | Age: 50
Discharge: HOME | End: 2025-04-16

## 2025-04-16 NOTE — FLOWSHEET NOTE
[x] [facility]  Outpatient Rehabilitation &  Therapy  [address]  P: [phone]  F: [phone] [] [facility]  Outpatient Rehabilitation &  Therapy  [address]  P: [phone]  F: [phone]       Date:  2025    Patient: Lobito Garcia  : 1975  MRN: [id number]    -Worked on upping carbs (hitting around 420-460g/day), protein is around 120g/day, fats around 90g/day  -Leaving after work tomorrow for [place]   -Bus is 6:15 AM--start time at 10 AM (ring 8)    -Before long runs (~140g): 20oz Gatorade, PBJ, banana, vivek oat sandwich or skippy bar     -20 oz water bottle w/ T[employer]wind (50g) + [place] every 5 miles

## 2025-04-21 ENCOUNTER — APPOINTMENT (OUTPATIENT)
Dept: PHYSICAL THERAPY | Facility: CLINIC | Age: 50
End: 2025-04-21
Payer: COMMERCIAL

## 2025-04-23 ENCOUNTER — HOSPITAL ENCOUNTER (OUTPATIENT)
Dept: PHYSICAL THERAPY | Facility: CLINIC | Age: 50
Setting detail: THERAPIES SERIES
Discharge: HOME | End: 2025-04-23
Payer: COMMERCIAL

## 2025-04-23 PROCEDURE — 97140 MANUAL THERAPY 1/> REGIONS: CPT

## 2025-04-23 NOTE — FLOWSHEET NOTE
[x] [facility]  Outpatient Rehabilitation &  Therapy  [address]  P: [phone]  F: [phone] [] [facility]  Outpatient Rehabilitation &  Therapy  [address]  P: [phone]  F: [phone]     Physical Therapy Daily Treatment Note    Date:  2025  Patient Name:  Lobito Garcia   :  1975  MRN: [id number]  Physician: Dr. Derek Perkins                           Insurance: [employer]; Danny yr; 40/40vs; no auth req; hard max; not comb; $40 copay; ded waived; 8460/6812.74 remain OOP   Medical Diagnosis: L LBP                 Rehab Codes: M54.5  Onset Date: 25                 Next 's appt.: 2 months  Visit# / total visits: 7/10   Cancels/No Shows: 0/0    Subjective:    Pain:  [x] Yes  [] No Location: L LB  Pain Rating: (0-10 scale) /10  Pain altered Tx:  [x] No  [] Yes  Action:  Comments:did 10 on Saturday and 5 this AM. This is the best my legs have ever felt heading into a marathon  Objective:  Modalities: none  Precautions [x] No  [] Yes:  Exercises:  Exercise Reps/ Time Weight/ Level Completed Comments   Bike 5' L3.5 x    Standing       Lax ball midfoot self mob x10      Toe yoga x20   Mirror and cue to     Supine          Lat to pec stretch on foam roller x10      Foam roller thoracic self mob x10       static bridge w/ alt SKTC 10x2 55 cm ball  Blue TB   cue for foot straight no IN    Diaphragmatic breathing 10x         Prone          Diaphragmatic breathing 10x             Supine deadbug with static hold weight overhead x20 20#     Primal pushup shoulder touches 1 sets      Dorinda breathing w/ primal pushup and LE extension 1 sets      MOBO foot rocks x20      MOBO star squats 5x        MOBO twist x10 2/4     Step down X10 ea 8\"  Lateral and posterior   Kyrgyz split squat x10      Monster walk 1 long lap grey     Other:  Manual  IASTM via hypervolt B calves, TFL, quad, glute med, piriformis      Specific Instructions for next

## 2025-05-13 ENCOUNTER — HOSPITAL ENCOUNTER (OUTPATIENT)
Dept: PHYSICAL THERAPY | Facility: CLINIC | Age: 50
Setting detail: THERAPIES SERIES
Discharge: HOME | End: 2025-05-13
Payer: COMMERCIAL

## 2025-05-13 ENCOUNTER — APPOINTMENT (OUTPATIENT)
Dept: PHYSICAL THERAPY | Facility: CLINIC | Age: 50
End: 2025-05-13
Payer: COMMERCIAL

## 2025-05-13 PROCEDURE — 97110 THERAPEUTIC EXERCISES: CPT

## 2025-05-13 PROCEDURE — 97140 MANUAL THERAPY 1/> REGIONS: CPT

## 2025-05-13 NOTE — FLOWSHEET NOTE
[x] [facility]  Outpatient Rehabilitation &  Therapy  [address]  P: [phone]  F: [phone] [] [facility]  Outpatient Rehabilitation &  Therapy  [address]  P: [phone]  F: [phone]     Physical Therapy Daily Treatment Note    Date:  2025  Patient Name:  Lobito Garcia   :  1975  MRN: [id number]  Physician: Dr. Derek Perkins                           Insurance: [employer]; Danny yr; 40/40vs; no auth req; hard max; not comb; $40 copay; ded waived; 1280/4327.74 remain OOP   Medical Diagnosis: L LBP                 Rehab Codes: M54.5  Onset Date: 25                 Next 's appt.: 2 months  Visit# / total visits: 8/10   Cancels/No Shows: 0/0    Subjective:    Pain:  [x] Yes  [] No Location: L calf and knee Pain Rating: (0-10 scale) 4/10  Pain altered Tx:  [x] No  [] Yes  Action:  Comments:So I did "[employer] 1/ and spent 12 hrs on my feet. Took the next week off and rested then was planning to go out for 4 miles but ended up doing 8 miles with the group. Felt pain after that then took a few days off and ran again easy 4-5 and still had pain. Running a 1/2 this weekend in [place]. Driving 8 hours.Hurts to bend the knee  Objective:  Modalities: none  Precautions [x] No  [] Yes:  Exercises:  Exercise Reps/ Time Weight/ Level Completed Comments   Bike 5' L3.5     Standing       Lax ball midfoot self mob x10  x    Toe yoga x20   Mirror and cue to     Supine          Lat to pec stretch on foam roller x10  x assist   Foam roller thoracic self mob x10  x     static bridge w/ alt SKTC 10x2 55 cm ball  Blue TB   cue for foot straight no IN    Diaphragmatic breathing 10x         Prone          Diaphragmatic breathing 10x             Supine deadbug with static hold weight overhead x20 20#     Primal pushup shoulder touches 1 sets      Dorinda breathing w/ primal pushup and LE extension 1 sets      MOBO foot rocks x20      MOBO star squats 5x        MOBO

## 2025-06-10 ENCOUNTER — HOSPITAL ENCOUNTER (OUTPATIENT)
Facility: CLINIC | Age: 50
Discharge: HOME | End: 2025-06-10
Payer: COMMERCIAL

## 2025-06-10 PROCEDURE — 9990000115

## 2025-06-10 NOTE — FLOWSHEET NOTE
[] [facility]  Outpatient Rehabilitation &  Therapy  [address]  P: [phone]  F: [phone] [x] [facility]  Outpatient Rehabilitation &  Therapy  [address]  P: [phone]  F: [phone]       Date:  6/10/2025    Patient: Lobito Garcia  : 1975 MRN: [id number]    Nothing more than 5 miles running, nothing more than 20 miles on the bike. Activity level down. Some summer social activities up.   -Running 3 days/week (2 mornings, Tues hills/hops); Biking 1-2x/week maybe (afternoons)     Ran [place] at 194#, now at 204#. Was looking back at past 6 weeks and saw some patterns that maybe contributed.     Been on the road past 3 weeks     AM - banana + something else small (2 fried eggs or uncrustable or 1/2 shikha egg and cheese)   L - cafeteria (personal pizza or chicken sandwich or gyros or salad bar--fries, onion rings); grill is always open, pizza station is always open; buffet rotates   D 7-7:30 PM - at home pretty structured - protein, carb, veggies     On the road:  -Hotel breakfast (oatmeal, eggs, fruit)  -Door Dash, CFA fall back  -Lunch sometimes bag of chips or says otherwise \"unhealthy\" or gas station food       Iron - 76  % Saturation - 27  Ferritin - 174   TIBC - 280     HDL - 37  LDL (calc) - 134  Cholesterol - 189  Trig - 90    **Rework goals for

## 2025-07-01 ENCOUNTER — HOSPITAL ENCOUNTER (OUTPATIENT)
Dept: PHYSICAL THERAPY | Facility: CLINIC | Age: 50
Setting detail: THERAPIES SERIES
Discharge: HOME | End: 2025-07-01
Payer: COMMERCIAL

## 2025-07-01 PROCEDURE — 97140 MANUAL THERAPY 1/> REGIONS: CPT

## 2025-07-01 NOTE — FLOWSHEET NOTE
x10 2/4     Step down X10 ea 8\"  Lateral and posterior   Icelandic split squat x10      Monster walk 1 long lap grey     Other:  Manual  IASTM via hypervolt B calves, TFL, quad,   DI piriformis glute med psoas, erector spinae lumbar spine  MWM posterior glide fibula B      Specific Instructions for next treatment:  1.continue with manual to improve hip mobility          Assessment: [x] Progressing toward goals. Pt presents this date with L elevated pelvis and lacks ER at hip B L > R.  Tender lateral calf with B calf spasm and lacking lumbar rotation upon initial testing.  Pelvis level and normal ER as well as normal lumbar rotation post manual work. Pt was also able to get into a deep squat without knee pain and walk without knee pain.     [] No change.     [] Other:    Goals:  STG: (to be met in 5 treatments)  ? Pain: <2/10 at all times in his L LB.   Met  ? ROM: SLR to 75 deg sindy and hip ext to 20 deg sindy  ? Strength:  ? Function: UWRI <20% impaired  Patient to be independent with home exercise program as demonstrated by performance with correct form without cues.  Progressing     LTG: (to be met in 10 treatments)  No LBP  UWRI <10% impaired  Full hip mobility        Patient goals: no LBP  Treatment Charges: Mins Units Time In/Out   []  Modalities        [x]  Ther Exercise 11 1 2350-7842   []  Neuromuscular Re-ed      []  Gait Training      [x]  Manual Therapy 27 2 1325-6005   []  Ther Activities      []  Aquatics      []  Vasocompression      []  Cervical Traction      []  Other      Total Billable time  38 3        Pt. Education:  [x] Yes  [] No  [] Reviewed Prior HEP/Ed  Method of Education:   [x] Verbal      [] Demo  [] Written  Access Code: [email or web address]  URL: [email or web address]  Date: 04/07/2025  Prepared by: Vivian Thompson    Program Notes  Husam Garcia     Exercises  - primal pushup w/ diaphragmatic breathing  - 1 x daily - 7 x weekly - 3 sets - 10 reps  - Primal Push Up with Shoulder Taps  - 1 x

## 2025-08-25 ENCOUNTER — HOSPITAL ENCOUNTER (OUTPATIENT)
Dept: PHYSICAL THERAPY | Facility: CLINIC | Age: 50
Setting detail: THERAPIES SERIES
Discharge: HOME | End: 2025-08-25
Payer: COMMERCIAL

## 2025-08-25 PROCEDURE — 97161 PT EVAL LOW COMPLEX 20 MIN: CPT

## 2025-08-25 PROCEDURE — 97110 THERAPEUTIC EXERCISES: CPT
